# Patient Record
Sex: FEMALE | Race: WHITE | URBAN - NONMETROPOLITAN AREA
[De-identification: names, ages, dates, MRNs, and addresses within clinical notes are randomized per-mention and may not be internally consistent; named-entity substitution may affect disease eponyms.]

---

## 2022-03-01 ENCOUNTER — OFFICE VISIT (OUTPATIENT)
Dept: URBAN - NONMETROPOLITAN AREA CLINIC 4 | Facility: CLINIC | Age: 52
End: 2022-03-01

## 2022-05-02 ENCOUNTER — WEB ENCOUNTER (OUTPATIENT)
Dept: URBAN - METROPOLITAN AREA CLINIC 54 | Facility: CLINIC | Age: 52
End: 2022-05-02

## 2022-05-02 ENCOUNTER — OFFICE VISIT (OUTPATIENT)
Dept: URBAN - METROPOLITAN AREA CLINIC 54 | Facility: CLINIC | Age: 52
End: 2022-05-02
Payer: COMMERCIAL

## 2022-05-02 DIAGNOSIS — E13.9 DIABETES 1.5, MANAGED AS TYPE 2: ICD-10-CM

## 2022-05-02 DIAGNOSIS — K52.9 CHRONIC DIARRHEA: ICD-10-CM

## 2022-05-02 DIAGNOSIS — K58.9 IRRITABLE BOWEL SYNDROME: ICD-10-CM

## 2022-05-02 PROBLEM — 426875007: Status: ACTIVE | Noted: 2022-05-02

## 2022-05-02 PROCEDURE — 99214 OFFICE O/P EST MOD 30 MIN: CPT | Performed by: INTERNAL MEDICINE

## 2022-05-02 RX ORDER — CETIRIZINE HYDROCHLORIDE 10 MG/1
1 TABLET TABLET, FILM COATED ORAL ONCE A DAY
Refills: 0 | Status: ACTIVE | COMMUNITY
Start: 1900-01-01

## 2022-05-02 RX ORDER — METFORMIN ER 500 MG 500 MG/1
1 TABLET WITH EVENING MEAL TABLET ORAL ONCE A DAY
Qty: 30 | Status: ACTIVE | COMMUNITY
Start: 2022-05-02

## 2022-05-02 RX ORDER — ENALAPRIL MALEATE AND HYDROCHLOROTHIAZIDE 10; 25 MG/1; MG/1
1 TABLET TABLET ORAL ONCE A DAY
Refills: 0 | Status: ACTIVE | COMMUNITY
Start: 1900-01-01

## 2022-05-02 RX ORDER — ESOMEPRAZOLE MAGNESIUM 20 MG/1
1 CAPSULE CAPSULE, DELAYED RELEASE ORAL ONCE A DAY
Refills: 0 | Status: ACTIVE | COMMUNITY
Start: 1900-01-01

## 2022-05-02 NOTE — HPI-TODAY'S VISIT:
52 YO FEMALE WITH HX OF GERD OBESITY MIGRAINE AND HP POLYP IN 2019 PRESENTS FOR F/U VISIT.  Pt reports in Sept she started to have change in bowel habits. She had been on abx for infection of sinus. Developed C dif infection as a result.  Pt reports that now if she goes on an abx or steroid she has a problem.  Pt reports that her stools are explosive and urgency for bowel movements as well.  Pt reports that she has been taking bentyl off and on since that starts. Takes lomotil prn as well.  Pt reports that she had IBS in the past which was improved until the C dif infection.  Pt reports that she has been taking probiotics a few times per week.  Pt reports that she has had multiple round of abx for URI . No chronic cough. No tobacco.    Pt reports that she has been on metformin for the past 2 years as well.  Pt reports that she came off the med after losing 40# and watching diet. Went back on it in Dec. Off x 4-5 ms.  Now the consistency of the stools are intermittent.  +mucous +oil in stools

## 2022-05-10 LAB
CALPROTECTIN, FECAL: 30
PANCREATIC ELASTASE, FECAL: 467

## 2022-05-11 ENCOUNTER — LAB OUTSIDE AN ENCOUNTER (OUTPATIENT)
Dept: URBAN - METROPOLITAN AREA CLINIC 54 | Facility: CLINIC | Age: 52
End: 2022-05-11

## 2022-05-17 LAB — GASTROINTESTINAL PATHOGEN: (no result)

## 2022-07-18 ENCOUNTER — OFFICE VISIT (OUTPATIENT)
Dept: URBAN - METROPOLITAN AREA CLINIC 54 | Facility: CLINIC | Age: 52
End: 2022-07-18

## 2022-07-18 RX ORDER — ESOMEPRAZOLE MAGNESIUM 20 MG/1
1 CAPSULE CAPSULE, DELAYED RELEASE ORAL ONCE A DAY
Refills: 0 | Status: ACTIVE | COMMUNITY
Start: 1900-01-01

## 2022-07-18 RX ORDER — METFORMIN ER 500 MG 500 MG/1
1 TABLET WITH EVENING MEAL TABLET ORAL ONCE A DAY
Qty: 30 | Status: ACTIVE | COMMUNITY
Start: 2022-05-02

## 2022-07-18 RX ORDER — ENALAPRIL MALEATE AND HYDROCHLOROTHIAZIDE 10; 25 MG/1; MG/1
1 TABLET TABLET ORAL ONCE A DAY
Refills: 0 | Status: ACTIVE | COMMUNITY
Start: 1900-01-01

## 2022-07-18 RX ORDER — CETIRIZINE HYDROCHLORIDE 10 MG/1
1 TABLET TABLET, FILM COATED ORAL ONCE A DAY
Refills: 0 | Status: ACTIVE | COMMUNITY
Start: 1900-01-01

## 2023-02-14 ENCOUNTER — APPOINTMENT (RX ONLY)
Dept: URBAN - METROPOLITAN AREA OTHER 13 | Facility: OTHER | Age: 53
Setting detail: DERMATOLOGY
End: 2023-02-14

## 2023-02-14 DIAGNOSIS — L82.1 OTHER SEBORRHEIC KERATOSIS: ICD-10-CM

## 2023-02-14 DIAGNOSIS — Z71.89 OTHER SPECIFIED COUNSELING: ICD-10-CM

## 2023-02-14 DIAGNOSIS — B07.8 OTHER VIRAL WARTS: ICD-10-CM

## 2023-02-14 PROCEDURE — 17110 DESTRUCTION B9 LES UP TO 14: CPT

## 2023-02-14 PROCEDURE — 99202 OFFICE O/P NEW SF 15 MIN: CPT | Mod: 25

## 2023-02-14 PROCEDURE — ? COUNSELING

## 2023-02-14 PROCEDURE — ? LIQUID NITROGEN

## 2023-02-14 ASSESSMENT — LOCATION DETAILED DESCRIPTION DERM
LOCATION DETAILED: LEFT INFERIOR ANTERIOR NECK
LOCATION DETAILED: LEFT MEDIAL UPPER BACK
LOCATION DETAILED: RIGHT INFERIOR LATERAL NECK
LOCATION DETAILED: LEFT SUPERIOR OCCIPITAL SCALP
LOCATION DETAILED: RIGHT LATERAL EYEBROW
LOCATION DETAILED: RIGHT INFERIOR ANTERIOR NECK

## 2023-02-14 ASSESSMENT — LOCATION SIMPLE DESCRIPTION DERM
LOCATION SIMPLE: LEFT UPPER BACK
LOCATION SIMPLE: RIGHT ANTERIOR NECK
LOCATION SIMPLE: LEFT ANTERIOR NECK
LOCATION SIMPLE: LEFT OCCIPITAL SCALP
LOCATION SIMPLE: RIGHT EYEBROW

## 2023-02-14 ASSESSMENT — LOCATION ZONE DERM
LOCATION ZONE: NECK
LOCATION ZONE: SCALP
LOCATION ZONE: TRUNK
LOCATION ZONE: FACE

## 2023-02-14 NOTE — PROCEDURE: LIQUID NITROGEN
Show Topical Anesthesia Variable?: Yes
Consent: Advised patient of the risk of possible scars and pigmentation at LN2 site, patient states she understand the risk
Medical Necessity Information: It is in your best interest to select a reason for this procedure from the list below. All of these items fulfill various CMS LCD requirements except the new and changing color options.
Add 52 Modifier (Optional): no
Spray Paint Text: The liquid nitrogen was applied to the skin utilizing a spray paint frosting technique.
Detail Level: Detailed

## 2024-05-02 ENCOUNTER — DASHBOARD ENCOUNTERS (OUTPATIENT)
Age: 54
End: 2024-05-02

## 2024-05-02 ENCOUNTER — OFFICE VISIT (OUTPATIENT)
Dept: URBAN - NONMETROPOLITAN AREA CLINIC 4 | Facility: CLINIC | Age: 54
End: 2024-05-02
Payer: COMMERCIAL

## 2024-05-02 VITALS
HEIGHT: 63 IN | TEMPERATURE: 97.7 F | DIASTOLIC BLOOD PRESSURE: 78 MMHG | SYSTOLIC BLOOD PRESSURE: 118 MMHG | HEART RATE: 98 BPM | WEIGHT: 160.8 LBS | BODY MASS INDEX: 28.49 KG/M2

## 2024-05-02 DIAGNOSIS — R10.32 LLQ ABDOMINAL PAIN: ICD-10-CM

## 2024-05-02 DIAGNOSIS — K58.9 IRRITABLE BOWEL SYNDROME WITHOUT DIARRHEA: ICD-10-CM

## 2024-05-02 DIAGNOSIS — E13.9 DIABETES 1.5, MANAGED AS TYPE 2: ICD-10-CM

## 2024-05-02 PROCEDURE — 99214 OFFICE O/P EST MOD 30 MIN: CPT | Performed by: PHYSICIAN ASSISTANT

## 2024-05-02 RX ORDER — CETIRIZINE HYDROCHLORIDE 10 MG/1
1 TABLET TABLET, FILM COATED ORAL ONCE A DAY
Refills: 0 | Status: ACTIVE | COMMUNITY
Start: 1900-01-01

## 2024-05-02 RX ORDER — ONABOTULINUMTOXINA 200 [USP'U]/1
AS DIRECTED INJECTION, POWDER, LYOPHILIZED, FOR SOLUTION INTRADERMAL; INTRAMUSCULAR
Status: ACTIVE | COMMUNITY

## 2024-05-02 RX ORDER — ESOMEPRAZOLE MAGNESIUM 20 MG/1
1 CAPSULE CAPSULE, DELAYED RELEASE ORAL ONCE A DAY
Refills: 0 | Status: ACTIVE | COMMUNITY
Start: 1900-01-01

## 2024-05-02 RX ORDER — METFORMIN ER 500 MG 500 MG/1
1 TABLET WITH EVENING MEAL TABLET ORAL ONCE A DAY
Qty: 30 | Status: ON HOLD | COMMUNITY
Start: 2022-05-02

## 2024-05-02 RX ORDER — ENALAPRIL MALEATE AND HYDROCHLOROTHIAZIDE 10; 25 MG/1; MG/1
1 TABLET TABLET ORAL ONCE A DAY
Refills: 0 | Status: ACTIVE | COMMUNITY
Start: 1900-01-01

## 2024-05-02 RX ORDER — DOXYCYCLINE HYCLATE 100 MG/1
1 TABLET TABLET, COATED ORAL ONCE A DAY
Qty: 10 | OUTPATIENT
Start: 2024-05-02 | End: 2024-05-12

## 2024-05-02 RX ORDER — CLONAZEPAM 0.5 MG/1
1 TABLET TABLET ORAL TWICE DAILY
Status: ACTIVE | COMMUNITY
Start: 2024-04-30

## 2024-07-08 ENCOUNTER — OFFICE VISIT (OUTPATIENT)
Dept: URBAN - NONMETROPOLITAN AREA CLINIC 4 | Facility: CLINIC | Age: 54
End: 2024-07-08
Payer: COMMERCIAL

## 2024-07-08 VITALS
BODY MASS INDEX: 28.6 KG/M2 | SYSTOLIC BLOOD PRESSURE: 121 MMHG | HEART RATE: 103 BPM | DIASTOLIC BLOOD PRESSURE: 76 MMHG | HEIGHT: 63 IN | TEMPERATURE: 98.1 F | WEIGHT: 161.4 LBS

## 2024-07-08 DIAGNOSIS — K55.8 ANGIODYSPLASIA OF SMALL INTESTINE: ICD-10-CM

## 2024-07-08 DIAGNOSIS — R10.32 LLQ ABDOMINAL PAIN: ICD-10-CM

## 2024-07-08 DIAGNOSIS — E13.9 DIABETES 1.5, MANAGED AS TYPE 2: ICD-10-CM

## 2024-07-08 DIAGNOSIS — K58.8 OTHER IRRITABLE BOWEL SYNDROME: ICD-10-CM

## 2024-07-08 PROBLEM — 30588004: Status: ACTIVE | Noted: 2024-07-08

## 2024-07-08 PROCEDURE — 99214 OFFICE O/P EST MOD 30 MIN: CPT | Performed by: PHYSICIAN ASSISTANT

## 2024-07-08 RX ORDER — ONABOTULINUMTOXINA 200 [USP'U]/1
AS DIRECTED INJECTION, POWDER, LYOPHILIZED, FOR SOLUTION INTRADERMAL; INTRAMUSCULAR
Status: ACTIVE | COMMUNITY

## 2024-07-08 RX ORDER — SODIUM PICOSULFATE, MAGNESIUM OXIDE, AND ANHYDROUS CITRIC ACID 12; 3.5; 1 G/175ML; G/175ML; MG/175ML
175 ML THE FIRST DOSE THE EVENING BEFORE AND SECOND DOSE THE MORNING OF COLONOSCOPY LIQUID ORAL
Qty: 1 UNSPECIFIED | Refills: 0 | OUTPATIENT
Start: 2024-07-08 | End: 2024-07-09

## 2024-07-08 RX ORDER — ENALAPRIL MALEATE AND HYDROCHLOROTHIAZIDE 10; 25 MG/1; MG/1
1 TABLET TABLET ORAL ONCE A DAY
Refills: 0 | Status: ACTIVE | COMMUNITY
Start: 1900-01-01

## 2024-07-08 RX ORDER — METFORMIN ER 500 MG 500 MG/1
1 TABLET WITH EVENING MEAL TABLET ORAL ONCE A DAY
Qty: 30 | Status: DISCONTINUED | COMMUNITY
Start: 2022-05-02

## 2024-07-08 RX ORDER — CETIRIZINE HYDROCHLORIDE 10 MG/1
1 TABLET TABLET, FILM COATED ORAL ONCE A DAY
Refills: 0 | Status: ACTIVE | COMMUNITY
Start: 1900-01-01

## 2024-07-08 RX ORDER — ESOMEPRAZOLE MAGNESIUM 20 MG/1
1 CAPSULE CAPSULE, DELAYED RELEASE ORAL ONCE A DAY
Refills: 0 | Status: ACTIVE | COMMUNITY
Start: 1900-01-01

## 2024-07-08 RX ORDER — CLONAZEPAM 0.5 MG/1
1 TABLET TABLET ORAL TWICE DAILY
Status: DISCONTINUED | COMMUNITY
Start: 2024-04-30

## 2024-07-08 NOTE — HPI-TODAY'S VISIT:
52 YO FEMALE WITH HX OF GERD OBESITY MIGRAINE AND HP POLYP IN 2019 PRESENTS FOR F/U VISIT.  Pt reports in Sept she started to have change in bowel habits. She had been on abx for infection of sinus. Developed C dif infection as a result.  Pt reports that now if she goes on an abx or steroid she has a problem.  Pt reports that her stools are explosive and urgency for bowel movements as well.  Pt reports that she has been taking bentyl off and on since that starts. Takes lomotil prn as well.  Pt reports that she had IBS in the past which was improved until the C dif infection.  Pt reports that she has been taking probiotics a few times per week.  Pt reports that she has had multiple round of abx for URI . No chronic cough. No tobacco.    Pt reports that she has been on metformin for the past 2 years as well.  Pt reports that she came off the med after losing 40# and watching diet. Went back on it in Dec. Off x 4-5 ms.  Now the consistency of the stools are intermittent.  +mucous +oil in stools  5.2.24 Here for follow up after she had n/v which severe and eventually lead to near syncope.  She states this happened after visiting a ffiends family who was ill in the hospital  After the near syncope, she had BRBPR with tissue like appearance to the stools.  Her last cscope was in 2018, normal and repeat due in 2029  7.8.24 here today for repeat episode as what happened in April 2024. abd pain, rectal beeding.  I advised her to go to the ER, noted to have left sided colitis  started on ABX, HD stable discharged today from [facility]  I asked patient to come see me today  labs reviewed  non smoker, non drinker  is taking 0.25 GLP-1 Ozempic for DM II- which was started in November  CT abd pel : \*Unknown; IMPRESSION: 1.  Mild diffuse fatty infiltration of the liver. 2.  Status post cholecystectomy and hysterectomy. 3.  Status post appendectomy. 4.  Mild thickening of the walls of the descending colon suspicious for an infectious or inflammatory colitis.

## 2024-07-16 ENCOUNTER — OFFICE VISIT (OUTPATIENT)
Dept: URBAN - NONMETROPOLITAN AREA CLINIC 4 | Facility: CLINIC | Age: 54
End: 2024-07-16

## 2024-08-12 ENCOUNTER — OFFICE VISIT (OUTPATIENT)
Dept: URBAN - METROPOLITAN AREA SURGERY CENTER 14 | Facility: SURGERY CENTER | Age: 54
End: 2024-08-12
Payer: COMMERCIAL

## 2024-08-12 ENCOUNTER — CLAIMS CREATED FROM THE CLAIM WINDOW (OUTPATIENT)
Dept: URBAN - METROPOLITAN AREA CLINIC 4 | Facility: CLINIC | Age: 54
End: 2024-08-12
Payer: COMMERCIAL

## 2024-08-12 DIAGNOSIS — K64.8 EXTERNAL HEMORRHOIDS: ICD-10-CM

## 2024-08-12 DIAGNOSIS — K64.4 PERIANAL SKIN TAGS: ICD-10-CM

## 2024-08-12 DIAGNOSIS — Z87.19 HISTORY OF COLITIS: ICD-10-CM

## 2024-08-12 DIAGNOSIS — K57.30 DIVERTICULA OF COLON: ICD-10-CM

## 2024-08-12 DIAGNOSIS — K63.89 OTHER SPECIFIED DISEASES OF INTESTINE: ICD-10-CM

## 2024-08-12 DIAGNOSIS — K64.8 OTHER HEMORRHOIDS: ICD-10-CM

## 2024-08-12 DIAGNOSIS — K57.30 DIVERTCULOSIS OF LG INT W/O PERFORATION OR ABSCESS W/O BLEEDING: ICD-10-CM

## 2024-08-12 DIAGNOSIS — R10.32 ABDOMINAL PAIN, LEFT LOWER QUADRANT: ICD-10-CM

## 2024-08-12 PROCEDURE — 45380 COLONOSCOPY AND BIOPSY: CPT | Performed by: INTERNAL MEDICINE

## 2024-08-12 PROCEDURE — 88342 IMHCHEM/IMCYTCHM 1ST ANTB: CPT | Performed by: PATHOLOGY

## 2024-08-12 PROCEDURE — 88313 SPECIAL STAINS GROUP 2: CPT | Performed by: PATHOLOGY

## 2024-08-12 PROCEDURE — 00811 ANES LWR INTST NDSC NOS: CPT | Performed by: NURSE ANESTHETIST, CERTIFIED REGISTERED

## 2024-08-12 PROCEDURE — 88305 TISSUE EXAM BY PATHOLOGIST: CPT | Performed by: PATHOLOGY

## 2024-08-12 RX ORDER — ONABOTULINUMTOXINA 200 [USP'U]/1
AS DIRECTED INJECTION, POWDER, LYOPHILIZED, FOR SOLUTION INTRADERMAL; INTRAMUSCULAR
Status: ACTIVE | COMMUNITY

## 2024-08-12 RX ORDER — CETIRIZINE HYDROCHLORIDE 10 MG/1
1 TABLET TABLET, FILM COATED ORAL ONCE A DAY
Refills: 0 | Status: ACTIVE | COMMUNITY
Start: 1900-01-01

## 2024-08-12 RX ORDER — ENALAPRIL MALEATE AND HYDROCHLOROTHIAZIDE 10; 25 MG/1; MG/1
1 TABLET TABLET ORAL ONCE A DAY
Refills: 0 | Status: ACTIVE | COMMUNITY
Start: 1900-01-01

## 2024-08-12 RX ORDER — ESOMEPRAZOLE MAGNESIUM 20 MG/1
1 CAPSULE CAPSULE, DELAYED RELEASE ORAL ONCE A DAY
Refills: 0 | Status: ACTIVE | COMMUNITY
Start: 1900-01-01

## 2024-08-23 ENCOUNTER — TELEPHONE ENCOUNTER (OUTPATIENT)
Dept: URBAN - NONMETROPOLITAN AREA CLINIC 4 | Facility: CLINIC | Age: 54
End: 2024-08-23

## 2024-08-23 ENCOUNTER — OFFICE VISIT (OUTPATIENT)
Dept: URBAN - METROPOLITAN AREA SURGERY CENTER 14 | Facility: SURGERY CENTER | Age: 54
End: 2024-08-23

## 2024-08-23 RX ORDER — DICYCLOMINE HYDROCHLORIDE 20 MG/1
1 TABLET TABLET ORAL THREE TIMES A DAY
Qty: 90 | Refills: 3 | OUTPATIENT
Start: 2024-08-27 | End: 2024-12-24

## 2024-08-30 ENCOUNTER — OFFICE VISIT (OUTPATIENT)
Dept: URBAN - NONMETROPOLITAN AREA CLINIC 4 | Facility: CLINIC | Age: 54
End: 2024-08-30
Payer: COMMERCIAL

## 2024-08-30 VITALS
DIASTOLIC BLOOD PRESSURE: 84 MMHG | WEIGHT: 164.2 LBS | HEIGHT: 63 IN | SYSTOLIC BLOOD PRESSURE: 131 MMHG | TEMPERATURE: 98.3 F | HEART RATE: 106 BPM | BODY MASS INDEX: 29.09 KG/M2

## 2024-08-30 DIAGNOSIS — K58.9 IRRITABLE BOWEL SYNDROME: ICD-10-CM

## 2024-08-30 DIAGNOSIS — E13.9 DIABETES 1.5, MANAGED AS TYPE 2: ICD-10-CM

## 2024-08-30 DIAGNOSIS — K52.9 COLITIS: ICD-10-CM

## 2024-08-30 DIAGNOSIS — K55.9 ISCHEMIC COLITIS: ICD-10-CM

## 2024-08-30 DIAGNOSIS — R10.32 LLQ ABDOMINAL PAIN: ICD-10-CM

## 2024-08-30 PROCEDURE — 99214 OFFICE O/P EST MOD 30 MIN: CPT | Performed by: PHYSICIAN ASSISTANT

## 2024-08-30 RX ORDER — ENALAPRIL MALEATE AND HYDROCHLOROTHIAZIDE 10; 25 MG/1; MG/1
1 TABLET TABLET ORAL ONCE A DAY
Refills: 0 | Status: ACTIVE | COMMUNITY
Start: 1900-01-01

## 2024-08-30 RX ORDER — ONABOTULINUMTOXINA 200 [USP'U]/1
AS DIRECTED INJECTION, POWDER, LYOPHILIZED, FOR SOLUTION INTRADERMAL; INTRAMUSCULAR
Status: ACTIVE | COMMUNITY

## 2024-08-30 RX ORDER — ESOMEPRAZOLE MAGNESIUM 20 MG/1
1 CAPSULE CAPSULE, DELAYED RELEASE ORAL ONCE A DAY
Refills: 0 | Status: ACTIVE | COMMUNITY
Start: 1900-01-01

## 2024-08-30 RX ORDER — DICYCLOMINE HYDROCHLORIDE 20 MG/1
1 TABLET TABLET ORAL THREE TIMES A DAY
Qty: 90 | Refills: 3 | Status: ACTIVE | COMMUNITY
Start: 2024-08-27 | End: 2024-12-24

## 2024-08-30 RX ORDER — BUDESONIDE 3 MG/1
1 CAPSULE CAPSULE ORAL ONCE A DAY
Qty: 30 CAPSULE | Refills: 1 | OUTPATIENT
Start: 2024-08-30

## 2024-08-30 RX ORDER — CETIRIZINE HYDROCHLORIDE 10 MG/1
1 TABLET TABLET, FILM COATED ORAL ONCE A DAY
Refills: 0 | Status: ACTIVE | COMMUNITY
Start: 1900-01-01

## 2024-08-30 NOTE — PHYSICAL EXAM CONSTITUTIONAL:
well developed, well nourished , in no acute distress , ambulating without difficulty , normal communication ability slurred speech

## 2024-08-30 NOTE — HPI-TODAY'S VISIT:
50 YO FEMALE WITH HX OF GERD OBESITY MIGRAINE AND HP POLYP IN 2019 PRESENTS FOR F/U VISIT.  Pt reports in Sept she started to have change in bowel habits. She had been on abx for infection of sinus. Developed C dif infection as a result.  Pt reports that now if she goes on an abx or steroid she has a problem.  Pt reports that her stools are explosive and urgency for bowel movements as well.  Pt reports that she has been taking bentyl off and on since that starts. Takes lomotil prn as well.  Pt reports that she had IBS in the past which was improved until the C dif infection.  Pt reports that she has been taking probiotics a few times per week.  Pt reports that she has had multiple round of abx for URI . No chronic cough. No tobacco.    Pt reports that she has been on metformin for the past 2 years as well.  Pt reports that she came off the med after losing 40# and watching diet. Went back on it in Dec. Off x 4-5 ms.  Now the consistency of the stools are intermittent.  +mucous +oil in stools  5.2.24 Here for follow up after she had n/v which severe and eventually lead to near syncope.  She states this happened after visiting a ffiends family who was ill in the hospital  After the near syncope, she had BRBPR with tissue like appearance to the stools.  Her last cscope was in 2018, normal and repeat due in 2029  7.8.24 here today for repeat episode as what happened in April 2024. abd pain, rectal beeding.  I advised her to go to the ER, noted to have left sided colitis  started on ABX, HD stable discharged today from [facility]  I asked patient to come see me today  labs reviewed  non smoker, non drinker  is taking 0.25 GLP-1 Ozempic for DM II- which was started in November  CT abd pel : \*Unknown;  IMPRESSION:  1.  Mild diffuse fatty infiltration of the liver.  2.  Status post cholecystectomy and hysterectomy.  3.  Status post appendectomy.  4.  Mild thickening of the walls of the descending colon suspicious for an infectious or inflammatory colitis.  8.30.24 cscope with mild diffuse garnularity of the entire colon, with bx with no abnormality off Ozempic, but with no further bleeding but persistent intermittent abd pain.

## 2024-09-16 ENCOUNTER — P2P PATIENT RECORD (OUTPATIENT)
Age: 54
End: 2024-09-16

## 2024-09-24 ENCOUNTER — WEB ENCOUNTER (OUTPATIENT)
Dept: URBAN - NONMETROPOLITAN AREA CLINIC 4 | Facility: CLINIC | Age: 54
End: 2024-09-24

## 2024-09-27 ENCOUNTER — OFFICE VISIT (OUTPATIENT)
Dept: URBAN - NONMETROPOLITAN AREA CLINIC 4 | Facility: CLINIC | Age: 54
End: 2024-09-27

## 2024-10-02 ENCOUNTER — OFFICE VISIT (OUTPATIENT)
Dept: URBAN - NONMETROPOLITAN AREA CLINIC 4 | Facility: CLINIC | Age: 54
End: 2024-10-02
Payer: COMMERCIAL

## 2024-10-02 VITALS
TEMPERATURE: 98 F | DIASTOLIC BLOOD PRESSURE: 100 MMHG | SYSTOLIC BLOOD PRESSURE: 141 MMHG | WEIGHT: 165 LBS | BODY MASS INDEX: 29.23 KG/M2 | HEIGHT: 63 IN | HEART RATE: 104 BPM

## 2024-10-02 DIAGNOSIS — K52.89 (LYMPHOCYTIC) MICROSCOPIC COLITIS: ICD-10-CM

## 2024-10-02 DIAGNOSIS — K58.9 IRRITABLE BOWEL SYNDROME: ICD-10-CM

## 2024-10-02 DIAGNOSIS — E13.9 DIABETES 1.5, MANAGED AS TYPE 2: ICD-10-CM

## 2024-10-02 DIAGNOSIS — R10.32 LLQ ABDOMINAL PAIN: ICD-10-CM

## 2024-10-02 PROCEDURE — 99214 OFFICE O/P EST MOD 30 MIN: CPT | Performed by: PHYSICIAN ASSISTANT

## 2024-10-02 RX ORDER — ONABOTULINUMTOXINA 200 [USP'U]/1
AS DIRECTED INJECTION, POWDER, LYOPHILIZED, FOR SOLUTION INTRADERMAL; INTRAMUSCULAR
COMMUNITY

## 2024-10-02 RX ORDER — CETIRIZINE HYDROCHLORIDE 10 MG/1
1 TABLET TABLET, FILM COATED ORAL ONCE A DAY
Refills: 0 | COMMUNITY
Start: 1900-01-01

## 2024-10-02 RX ORDER — DICYCLOMINE HYDROCHLORIDE 20 MG/1
1 TABLET TABLET ORAL THREE TIMES A DAY
Qty: 90 | Refills: 3 | COMMUNITY
Start: 2024-08-27 | End: 2024-12-24

## 2024-10-02 RX ORDER — BUDESONIDE 3 MG/1
1 CAPSULE CAPSULE ORAL ONCE A DAY
Qty: 30 CAPSULE | Refills: 1 | COMMUNITY
Start: 2024-08-30

## 2024-10-02 RX ORDER — ESOMEPRAZOLE MAGNESIUM 20 MG/1
1 CAPSULE CAPSULE, DELAYED RELEASE ORAL ONCE A DAY
Refills: 0 | COMMUNITY
Start: 1900-01-01

## 2024-10-02 RX ORDER — ENALAPRIL MALEATE AND HYDROCHLOROTHIAZIDE 10; 25 MG/1; MG/1
1 TABLET TABLET ORAL ONCE A DAY
Refills: 0 | COMMUNITY
Start: 1900-01-01

## 2024-10-02 RX ORDER — MESALAMINE 1.2 G/1
2 TABLETS WITH A MEAL TABLET, DELAYED RELEASE ORAL ONCE A DAY
Qty: 120 TABLET | Refills: 1 | OUTPATIENT
Start: 2024-10-02 | End: 2025-01-29

## 2024-10-02 NOTE — HPI-TODAY'S VISIT:
50 YO FEMALE WITH HX OF GERD OBESITY MIGRAINE AND HP POLYP IN 2019 PRESENTS FOR F/U VISIT.  Pt reports in Sept she started to have change in bowel habits. She had been on abx for infection of sinus. Developed C dif infection as a result.  Pt reports that now if she goes on an abx or steroid she has a problem.  Pt reports that her stools are explosive and urgency for bowel movements as well.  Pt reports that she has been taking bentyl off and on since that starts. Takes lomotil prn as well.  Pt reports that she had IBS in the past which was improved until the C dif infection.  Pt reports that she has been taking probiotics a few times per week.  Pt reports that she has had multiple round of abx for URI . No chronic cough. No tobacco.    Pt reports that she has been on metformin for the past 2 years as well.  Pt reports that she came off the med after losing 40# and watching diet. Went back on it in Dec. Off x 4-5 ms.  Now the consistency of the stools are intermittent.  +mucous +oil in stools  5.2.24 Here for follow up after she had n/v which severe and eventually lead to near syncope.  She states this happened after visiting a ffiends family who was ill in the hospital  After the near syncope, she had BRBPR with tissue like appearance to the stools.  Her last cscope was in 2018, normal and repeat due in 2029  7.8.24 here today for repeat episode as what happened in April 2024. abd pain, rectal beeding.  I advised her to go to the ER, noted to have left sided colitis  started on ABX, HD stable discharged today from [facility]  I asked patient to come see me today  labs reviewed  non smoker, non drinker  is taking 0.25 GLP-1 Ozempic for DM II- which was started in November  CT abd pel : \*Unknown;  IMPRESSION:  1.  Mild diffuse fatty infiltration of the liver.  2.  Status post cholecystectomy and hysterectomy.  3.  Status post appendectomy.  4.  Mild thickening of the walls of the descending colon suspicious for an infectious or inflammatory colitis.  8.30.24 cscope with mild diffuse garnularity of the entire colon, with bx with no abnormality off Ozempic, but with no further bleeding but persistent intermittent abd pain.  10.2.24 doing well, although with tapering of budesonide, did have some reoccurence of cramping no fevers, no chills.  afebrile

## 2024-10-05 ENCOUNTER — P2P PATIENT RECORD (OUTPATIENT)
Age: 54
End: 2024-10-05

## 2024-10-10 LAB
ANA SCREEN, IFA: NEGATIVE
ANCA SCREEN: NEGATIVE
C-REACTIVE PROTEIN, QUANT: 7.5
CYCLIC CITRULLINATED PEPTIDE (CCP) AB (IGG): <16
GASCA: 13.8
INR: 1
INTERPRETATION: (no result)
MYELOPEROXIDASE ANTIBODY: <1
PROTEINASE-3 ANTIBODY: <1
PT: 10.5
RHEUMATOID FACTOR: <10
SACCHAROMYCES CEREVISIAE AB (ASCA) (IGA): 5.3

## 2024-12-03 ENCOUNTER — OFFICE VISIT (OUTPATIENT)
Dept: URBAN - NONMETROPOLITAN AREA CLINIC 4 | Facility: CLINIC | Age: 54
End: 2024-12-03
Payer: COMMERCIAL

## 2024-12-03 VITALS
HEIGHT: 63 IN | DIASTOLIC BLOOD PRESSURE: 77 MMHG | HEART RATE: 113 BPM | WEIGHT: 176 LBS | TEMPERATURE: 98 F | SYSTOLIC BLOOD PRESSURE: 120 MMHG | BODY MASS INDEX: 31.18 KG/M2

## 2024-12-03 DIAGNOSIS — R10.32 LLQ ABDOMINAL PAIN: ICD-10-CM

## 2024-12-03 DIAGNOSIS — E13.9 DIABETES 1.5, MANAGED AS TYPE 2: ICD-10-CM

## 2024-12-03 DIAGNOSIS — K58.9 IRRITABLE BOWEL SYNDROME: ICD-10-CM

## 2024-12-03 DIAGNOSIS — K52.9 COLITIS: ICD-10-CM

## 2024-12-03 PROCEDURE — 99214 OFFICE O/P EST MOD 30 MIN: CPT | Performed by: PHYSICIAN ASSISTANT

## 2024-12-03 RX ORDER — DICYCLOMINE HYDROCHLORIDE 20 MG/1
1 TABLET TABLET ORAL THREE TIMES A DAY
Qty: 90 | Refills: 3 | Status: ACTIVE | COMMUNITY
Start: 2024-08-27 | End: 2024-12-24

## 2024-12-03 RX ORDER — ONABOTULINUMTOXINA 200 [USP'U]/1
AS DIRECTED INJECTION, POWDER, LYOPHILIZED, FOR SOLUTION INTRADERMAL; INTRAMUSCULAR
Status: ACTIVE | COMMUNITY

## 2024-12-03 RX ORDER — CETIRIZINE HYDROCHLORIDE 10 MG/1
1 TABLET TABLET, FILM COATED ORAL ONCE A DAY
Refills: 0 | Status: ACTIVE | COMMUNITY
Start: 1900-01-01

## 2024-12-03 RX ORDER — ESOMEPRAZOLE MAGNESIUM 20 MG/1
1 CAPSULE CAPSULE, DELAYED RELEASE ORAL ONCE A DAY
Refills: 0 | Status: ACTIVE | COMMUNITY
Start: 1900-01-01

## 2024-12-03 RX ORDER — MESALAMINE 1.2 G/1
2 TABLETS WITH A MEAL TABLET, DELAYED RELEASE ORAL ONCE A DAY
Qty: 120 TABLET | Refills: 1 | Status: ACTIVE | COMMUNITY
Start: 2024-10-02 | End: 2025-01-29

## 2024-12-03 RX ORDER — MESALAMINE 1.2 G/1
2 TABLETS WITH A MEAL TABLET, DELAYED RELEASE ORAL ONCE A DAY
Qty: 120 TABLET | Refills: 1 | OUTPATIENT

## 2024-12-03 RX ORDER — ENALAPRIL MALEATE AND HYDROCHLOROTHIAZIDE 10; 25 MG/1; MG/1
1 TABLET TABLET ORAL ONCE A DAY
Refills: 0 | Status: ACTIVE | COMMUNITY
Start: 1900-01-01

## 2024-12-03 RX ORDER — BUDESONIDE 3 MG/1
1 CAPSULE CAPSULE ORAL ONCE A DAY
Qty: 30 CAPSULE | Refills: 1 | Status: ACTIVE | COMMUNITY
Start: 2024-08-30

## 2024-12-03 NOTE — HPI-TODAY'S VISIT:
52 YO FEMALE WITH HX OF GERD OBESITY MIGRAINE AND HP POLYP IN 2019 PRESENTS FOR F/U VISIT.  Pt reports in Sept she started to have change in bowel habits. She had been on abx for infection of sinus. Developed C dif infection as a result.  Pt reports that now if she goes on an abx or steroid she has a problem.  Pt reports that her stools are explosive and urgency for bowel movements as well.  Pt reports that she has been taking bentyl off and on since that starts. Takes lomotil prn as well.  Pt reports that she had IBS in the past which was improved until the C dif infection.  Pt reports that she has been taking probiotics a few times per week.  Pt reports that she has had multiple round of abx for URI . No chronic cough. No tobacco.    Pt reports that she has been on metformin for the past 2 years as well.  Pt reports that she came off the med after losing 40# and watching diet. Went back on it in Dec. Off x 4-5 ms.  Now the consistency of the stools are intermittent.  +mucous +oil in stools  5.2.24 Here for follow up after she had n/v which severe and eventually lead to near syncope.  She states this happened after visiting a ffiends family who was ill in the hospital  After the near syncope, she had BRBPR with tissue like appearance to the stools.  Her last cscope was in 2018, normal and repeat due in 2029  7.8.24 here today for repeat episode as what happened in April 2024. abd pain, rectal beeding.  I advised her to go to the ER, noted to have left sided colitis  started on ABX, HD stable discharged today from [facility]  I asked patient to come see me today  labs reviewed  non smoker, non drinker  is taking 0.25 GLP-1 Ozempic for DM II- which was started in November  CT abd pel : \*Unknown;  IMPRESSION:  1.  Mild diffuse fatty infiltration of the liver.  2.  Status post cholecystectomy and hysterectomy.  3.  Status post appendectomy.  4.  Mild thickening of the walls of the descending colon suspicious for an infectious or inflammatory colitis.  8.30.24 cscope with mild diffuse garnularity of the entire colon, with bx with no abnormality off Ozempic, but with no further bleeding but persistent intermittent abd pain.  10.2.24 doing well, although with tapering of budesonide, did have some reoccurence of cramping no fevers, no chills.  afebrile  12.3.24 no abd pain, no diarrhea taking (2) 1.2 g mesalamine tablets daily, with essentially resolution of symptoms.

## 2025-01-25 ENCOUNTER — WEB ENCOUNTER (OUTPATIENT)
Age: 55
End: 2025-01-25

## 2025-01-25 RX ORDER — MESALAMINE 1.2 G/1
2 TABLETS WITH A MEAL TABLET, DELAYED RELEASE ORAL ONCE A DAY
Qty: 120 TABLET | Refills: 1

## 2025-01-25 RX ORDER — BUDESONIDE 3 MG/1
2 CAPSULES CAPSULE ORAL ONCE A DAY
Qty: 60 CAPSULE | Refills: 0 | OUTPATIENT
Start: 2025-01-25

## 2025-03-03 ENCOUNTER — WEB ENCOUNTER (OUTPATIENT)
Dept: URBAN - NONMETROPOLITAN AREA CLINIC 4 | Facility: CLINIC | Age: 55
End: 2025-03-03

## 2025-03-03 RX ORDER — MESALAMINE 1.2 G/1
2 TABLETS WITH A MEAL TABLET, DELAYED RELEASE ORAL ONCE A DAY
Qty: 120 TABLET | Refills: 1
End: 2025-07-02

## 2025-03-04 ENCOUNTER — TELEPHONE ENCOUNTER (OUTPATIENT)
Dept: URBAN - NONMETROPOLITAN AREA CLINIC 4 | Facility: CLINIC | Age: 55
End: 2025-03-04

## 2025-03-05 ENCOUNTER — OFFICE VISIT (OUTPATIENT)
Dept: URBAN - NONMETROPOLITAN AREA CLINIC 4 | Facility: CLINIC | Age: 55
End: 2025-03-05
Payer: COMMERCIAL

## 2025-03-05 VITALS
TEMPERATURE: 98.2 F | HEART RATE: 106 BPM | DIASTOLIC BLOOD PRESSURE: 84 MMHG | WEIGHT: 183 LBS | BODY MASS INDEX: 32.43 KG/M2 | SYSTOLIC BLOOD PRESSURE: 123 MMHG | HEIGHT: 63 IN

## 2025-03-05 DIAGNOSIS — K58.0 IBS-D: ICD-10-CM

## 2025-03-05 DIAGNOSIS — R10.32 LLQ ABDOMINAL PAIN: ICD-10-CM

## 2025-03-05 DIAGNOSIS — E13.9 DIABETES 1.5, MANAGED AS TYPE 2: ICD-10-CM

## 2025-03-05 PROCEDURE — 99214 OFFICE O/P EST MOD 30 MIN: CPT | Performed by: PHYSICIAN ASSISTANT

## 2025-03-05 RX ORDER — BUDESONIDE 3 MG/1
1 CAPSULE CAPSULE ORAL ONCE A DAY
Qty: 30 CAPSULE | Refills: 1 | Status: ACTIVE | COMMUNITY
Start: 2024-08-30

## 2025-03-05 RX ORDER — ESOMEPRAZOLE MAGNESIUM 20 MG/1
1 CAPSULE CAPSULE, DELAYED RELEASE ORAL ONCE A DAY
Refills: 0 | Status: ACTIVE | COMMUNITY
Start: 1900-01-01

## 2025-03-05 RX ORDER — ONABOTULINUMTOXINA 200 [USP'U]/1
AS DIRECTED INJECTION, POWDER, LYOPHILIZED, FOR SOLUTION INTRADERMAL; INTRAMUSCULAR
Status: ACTIVE | COMMUNITY

## 2025-03-05 RX ORDER — CETIRIZINE HYDROCHLORIDE 10 MG/1
1 TABLET TABLET, FILM COATED ORAL ONCE A DAY
Refills: 0 | Status: ACTIVE | COMMUNITY
Start: 1900-01-01

## 2025-03-05 RX ORDER — BUDESONIDE 3 MG/1
2 CAPSULES CAPSULE ORAL ONCE A DAY
Qty: 60 CAPSULE | Refills: 0 | Status: ACTIVE | COMMUNITY
Start: 2025-01-25

## 2025-03-05 RX ORDER — ENALAPRIL MALEATE AND HYDROCHLOROTHIAZIDE 10; 25 MG/1; MG/1
1 TABLET TABLET ORAL ONCE A DAY
Refills: 0 | Status: ACTIVE | COMMUNITY
Start: 1900-01-01

## 2025-03-05 RX ORDER — MESALAMINE 1.2 G/1
2 TABLETS WITH A MEAL TABLET, DELAYED RELEASE ORAL ONCE A DAY
Qty: 120 TABLET | Refills: 1 | Status: ACTIVE | COMMUNITY
End: 2025-07-02

## 2025-03-05 NOTE — HPI-TODAY'S VISIT:
52 YO FEMALE WITH HX OF GERD OBESITY MIGRAINE AND HP POLYP IN 2019 PRESENTS FOR F/U VISIT.  Pt reports in Sept she started to have change in bowel habits. She had been on abx for infection of sinus. Developed C dif infection as a result.  Pt reports that now if she goes on an abx or steroid she has a problem.  Pt reports that her stools are explosive and urgency for bowel movements as well.  Pt reports that she has been taking bentyl off and on since that starts. Takes lomotil prn as well.  Pt reports that she had IBS in the past which was improved until the C dif infection.  Pt reports that she has been taking probiotics a few times per week.  Pt reports that she has had multiple round of abx for URI . No chronic cough. No tobacco.    Pt reports that she has been on metformin for the past 2 years as well.  Pt reports that she came off the med after losing 40# and watching diet. Went back on it in Dec. Off x 4-5 ms.  Now the consistency of the stools are intermittent.  +mucous +oil in stools  5.2.24 Here for follow up after she had n/v which severe and eventually lead to near syncope.  She states this happened after visiting a ffiends family who was ill in the hospital  After the near syncope, she had BRBPR with tissue like appearance to the stools.  Her last cscope was in 2018, normal and repeat due in 2029  7.8.24 here today for repeat episode as what happened in April 2024. abd pain, rectal beeding.  I advised her to go to the ER, noted to have left sided colitis  started on ABX, HD stable discharged today from [facility]  I asked patient to come see me today  labs reviewed  non smoker, non drinker  is taking 0.25 GLP-1 Ozempic for DM II- which was started in November  CT abd pel : \*Unknown;  IMPRESSION:  1.  Mild diffuse fatty infiltration of the liver.  2.  Status post cholecystectomy and hysterectomy.  3.  Status post appendectomy.  4.  Mild thickening of the walls of the descending colon suspicious for an infectious or inflammatory colitis.  8.30.24 cscope with mild diffuse garnularity of the entire colon, with bx with no abnormality off Ozempic, but with no further bleeding but persistent intermittent abd pain.  10.2.24 doing well, although with tapering of budesonide, did have some reoccurence of cramping no fevers, no chills.  afebrile  12.3.24 no abd pain, no diarrhea taking (2) 1.2 g mesalamine tablets daily, with essentially resolution of symptoms.  3.5.25 Tracee is continuing to have abdominal pain, she denies any blood in the stool. Because of her abdominal pain I went ahead in order of repeat of the CT of the abdomen pelvis which again shows this persistent left sided colitis.  She is already taking Lialda 2 tablets daily with  no improvement in symtpoms.  Previously responded to prednisone, but no improvement with Budesonide.

## 2025-03-06 ENCOUNTER — TELEPHONE ENCOUNTER (OUTPATIENT)
Dept: URBAN - NONMETROPOLITAN AREA CLINIC 4 | Facility: CLINIC | Age: 55
End: 2025-03-06

## 2025-03-08 LAB
VARICELLA ZOSTER VIRUS ANTIBODY (IGG): 41.8
VARICELLA ZOSTER VIRUS ANTIBODY (IGM): <=0.9

## 2025-03-14 ENCOUNTER — TELEPHONE ENCOUNTER (OUTPATIENT)
Dept: URBAN - NONMETROPOLITAN AREA CLINIC 4 | Facility: CLINIC | Age: 55
End: 2025-03-14

## 2025-03-14 RX ORDER — UPADACITINIB 30 MG/1
1 TABLET TABLET, EXTENDED RELEASE ORAL ONCE A DAY
Qty: 30 | Refills: 1 | OUTPATIENT
Start: 2025-03-14 | End: 2025-05-13

## 2025-03-14 RX ORDER — UPADACITINIB 45 MG/1
AS DIRECTED TABLET, EXTENDED RELEASE ORAL DAILY
Qty: 60 | Refills: 0 | OUTPATIENT
Start: 2025-03-14 | End: 2025-05-13

## 2025-03-17 ENCOUNTER — TELEPHONE ENCOUNTER (OUTPATIENT)
Dept: URBAN - NONMETROPOLITAN AREA CLINIC 4 | Facility: CLINIC | Age: 55
End: 2025-03-17

## 2025-03-18 ENCOUNTER — TELEPHONE ENCOUNTER (OUTPATIENT)
Dept: URBAN - NONMETROPOLITAN AREA CLINIC 4 | Facility: CLINIC | Age: 55
End: 2025-03-18

## 2025-03-18 RX ORDER — UPADACITINIB 30 MG/1
1 TABLET TABLET, EXTENDED RELEASE ORAL ONCE A DAY
Qty: 30 | Refills: 1
Start: 2025-03-14 | End: 2025-05-17

## 2025-03-18 RX ORDER — UPADACITINIB 45 MG/1
AS DIRECTED TABLET, EXTENDED RELEASE ORAL DAILY
Qty: 60 | Refills: 0
Start: 2025-03-14 | End: 2025-05-17

## 2025-03-21 ENCOUNTER — TELEPHONE ENCOUNTER (OUTPATIENT)
Dept: URBAN - NONMETROPOLITAN AREA CLINIC 4 | Facility: CLINIC | Age: 55
End: 2025-03-21

## 2025-03-27 ENCOUNTER — TELEPHONE ENCOUNTER (OUTPATIENT)
Dept: URBAN - NONMETROPOLITAN AREA CLINIC 4 | Facility: CLINIC | Age: 55
End: 2025-03-27

## 2025-03-28 ENCOUNTER — TELEPHONE ENCOUNTER (OUTPATIENT)
Dept: URBAN - NONMETROPOLITAN AREA CLINIC 4 | Facility: CLINIC | Age: 55
End: 2025-03-28

## 2025-04-04 ENCOUNTER — TELEPHONE ENCOUNTER (OUTPATIENT)
Dept: URBAN - NONMETROPOLITAN AREA CLINIC 4 | Facility: CLINIC | Age: 55
End: 2025-04-04

## 2025-04-28 ENCOUNTER — OFFICE VISIT (OUTPATIENT)
Dept: URBAN - NONMETROPOLITAN AREA CLINIC 4 | Facility: CLINIC | Age: 55
End: 2025-04-28

## 2025-04-29 ENCOUNTER — OFFICE VISIT (OUTPATIENT)
Dept: URBAN - NONMETROPOLITAN AREA CLINIC 4 | Facility: CLINIC | Age: 55
End: 2025-04-29
Payer: COMMERCIAL

## 2025-04-29 DIAGNOSIS — K58.9 IRRITABLE BOWEL SYNDROME: ICD-10-CM

## 2025-04-29 DIAGNOSIS — R10.32 LLQ ABDOMINAL PAIN: ICD-10-CM

## 2025-04-29 DIAGNOSIS — K52.9 COLITIS: ICD-10-CM

## 2025-04-29 DIAGNOSIS — E13.9 DIABETES 1.5, MANAGED AS TYPE 2: ICD-10-CM

## 2025-04-29 PROCEDURE — 99214 OFFICE O/P EST MOD 30 MIN: CPT | Performed by: PHYSICIAN ASSISTANT

## 2025-04-29 RX ORDER — UPADACITINIB 30 MG/1
1 TABLET TABLET, EXTENDED RELEASE ORAL ONCE A DAY
Qty: 30 | Refills: 1 | Status: ACTIVE | COMMUNITY
Start: 2025-03-14 | End: 2025-05-17

## 2025-04-29 RX ORDER — ESOMEPRAZOLE MAGNESIUM 20 MG/1
1 CAPSULE CAPSULE, DELAYED RELEASE ORAL ONCE A DAY
Refills: 0 | Status: ACTIVE | COMMUNITY
Start: 1900-01-01

## 2025-04-29 RX ORDER — ENALAPRIL MALEATE AND HYDROCHLOROTHIAZIDE 10; 25 MG/1; MG/1
1 TABLET TABLET ORAL ONCE A DAY
Refills: 0 | Status: ACTIVE | COMMUNITY
Start: 1900-01-01

## 2025-04-29 RX ORDER — UPADACITINIB 30 MG/1
1 TABLET TABLET, EXTENDED RELEASE ORAL ONCE A DAY
Qty: 30 | Refills: 1
Start: 2025-04-29 | End: 2025-06-28

## 2025-04-29 RX ORDER — BUDESONIDE 3 MG/1
1 CAPSULE CAPSULE ORAL ONCE A DAY
Qty: 30 CAPSULE | Refills: 1 | Status: ON HOLD | COMMUNITY
Start: 2024-08-30

## 2025-04-29 RX ORDER — BUDESONIDE 3 MG/1
2 CAPSULES CAPSULE ORAL ONCE A DAY
Qty: 60 CAPSULE | Refills: 0 | Status: ON HOLD | COMMUNITY
Start: 2025-01-25

## 2025-04-29 RX ORDER — UPADACITINIB 45 MG/1
AS DIRECTED TABLET, EXTENDED RELEASE ORAL DAILY
Qty: 60 | Refills: 0
Start: 2025-04-29 | End: 2025-06-28

## 2025-04-29 RX ORDER — UPADACITINIB 45 MG/1
AS DIRECTED TABLET, EXTENDED RELEASE ORAL DAILY
Qty: 60 | Refills: 0 | Status: ACTIVE | COMMUNITY
Start: 2025-03-14 | End: 2025-05-17

## 2025-04-29 RX ORDER — MESALAMINE 1.2 G/1
2 TABLETS WITH A MEAL TABLET, DELAYED RELEASE ORAL ONCE A DAY
Qty: 120 TABLET | Refills: 1 | Status: ON HOLD | COMMUNITY
End: 2025-07-02

## 2025-04-29 RX ORDER — CETIRIZINE HYDROCHLORIDE 10 MG/1
1 TABLET TABLET, FILM COATED ORAL ONCE A DAY
Refills: 0 | Status: ACTIVE | COMMUNITY
Start: 1900-01-01

## 2025-04-29 RX ORDER — ONABOTULINUMTOXINA 200 [USP'U]/1
AS DIRECTED INJECTION, POWDER, LYOPHILIZED, FOR SOLUTION INTRADERMAL; INTRAMUSCULAR
Status: ACTIVE | COMMUNITY

## 2025-04-29 NOTE — HPI-TODAY'S VISIT:
50 YO FEMALE WITH HX OF GERD OBESITY MIGRAINE AND HP POLYP IN 2019 PRESENTS FOR F/U VISIT.  Pt reports in Sept she started to have change in bowel habits. She had been on abx for infection of sinus. Developed C dif infection as a result.  Pt reports that now if she goes on an abx or steroid she has a problem.  Pt reports that her stools are explosive and urgency for bowel movements as well.  Pt reports that she has been taking bentyl off and on since that starts. Takes lomotil prn as well.  Pt reports that she had IBS in the past which was improved until the C dif infection.  Pt reports that she has been taking probiotics a few times per week.  Pt reports that she has had multiple round of abx for URI . No chronic cough. No tobacco.    Pt reports that she has been on metformin for the past 2 years as well.  Pt reports that she came off the med after losing 40# and watching diet. Went back on it in Dec. Off x 4-5 ms.  Now the consistency of the stools are intermittent.  +mucous +oil in stools  5.2.24 Here for follow up after she had n/v which severe and eventually lead to near syncope.  She states this happened after visiting a ffiends family who was ill in the hospital  After the near syncope, she had BRBPR with tissue like appearance to the stools.  Her last cscope was in 2018, normal and repeat due in 2029  7.8.24 here today for repeat episode as what happened in April 2024. abd pain, rectal beeding.  I advised her to go to the ER, noted to have left sided colitis  started on ABX, HD stable discharged today from [facility]  I asked patient to come see me today  labs reviewed  non smoker, non drinker  is taking 0.25 GLP-1 Ozempic for DM II- which was started in November  CT abd pel : \*Unknown;  IMPRESSION:  1.  Mild diffuse fatty infiltration of the liver.  2.  Status post cholecystectomy and hysterectomy.  3.  Status post appendectomy.  4.  Mild thickening of the walls of the descending colon suspicious for an infectious or inflammatory colitis.  8.30.24 cscope with mild diffuse garnularity of the entire colon, with bx with no abnormality off Ozempic, but with no further bleeding but persistent intermittent abd pain.  10.2.24 doing well, although with tapering of budesonide, did have some reoccurence of cramping no fevers, no chills.  afebrile  12.3.24 no abd pain, no diarrhea taking (2) 1.2 g mesalamine tablets daily, with essentially resolution of symptoms.  3.5.25 Tracee is continuing to have abdominal pain, she denies any blood in the stool. Because of her abdominal pain I went ahead in order of repeat of the CT of the abdomen pelvis which again shows this persistent left sided colitis.  She is already taking Lialda 2 tablets daily with  no improvement in symtpoms.  Previously responded to prednisone, but no improvement with Budesonide.  4.29.25 So far it seems like we are moving along and she absolutely notices an improvement with the RInvoq induction.  no BRBPR, no diarrhea still with occasional point LLQ pain.

## 2025-06-02 ENCOUNTER — WEB ENCOUNTER (OUTPATIENT)
Dept: URBAN - NONMETROPOLITAN AREA CLINIC 4 | Facility: CLINIC | Age: 55
End: 2025-06-02

## 2025-06-02 RX ORDER — SODIUM PICOSULFATE, MAGNESIUM OXIDE, AND ANHYDROUS CITRIC ACID 12; 3.5; 1 G/175ML; G/175ML; MG/175ML
175 ML THE FIRST DOSE THE EVENING BEFORE AND SECOND DOSE THE MORNING OF COLONOSCOPY LIQUID ORAL ONCE A DAY
Qty: 350 | OUTPATIENT
Start: 2025-06-03 | End: 2025-06-05

## 2025-07-14 ENCOUNTER — TELEPHONE ENCOUNTER (OUTPATIENT)
Dept: URBAN - NONMETROPOLITAN AREA CLINIC 4 | Facility: CLINIC | Age: 55
End: 2025-07-14

## 2025-07-23 ENCOUNTER — OFFICE VISIT (OUTPATIENT)
Dept: URBAN - NONMETROPOLITAN AREA CLINIC 4 | Facility: CLINIC | Age: 55
End: 2025-07-23
Payer: COMMERCIAL

## 2025-07-23 DIAGNOSIS — E13.9 DIABETES 1.5, MANAGED AS TYPE 2: ICD-10-CM

## 2025-07-23 DIAGNOSIS — K52.89 (LYMPHOCYTIC) MICROSCOPIC COLITIS: ICD-10-CM

## 2025-07-23 DIAGNOSIS — K58.9 IRRITABLE BOWEL SYNDROME: ICD-10-CM

## 2025-07-23 DIAGNOSIS — R10.32 LLQ ABDOMINAL PAIN: ICD-10-CM

## 2025-07-23 PROCEDURE — 99214 OFFICE O/P EST MOD 30 MIN: CPT | Performed by: PHYSICIAN ASSISTANT

## 2025-07-23 RX ORDER — BUDESONIDE 3 MG/1
3 CAPSULES CAPSULE, DELAYED RELEASE PELLETS ORAL ONCE A DAY
Qty: 168 CAPSULE | Refills: 3 | OUTPATIENT
Start: 2025-07-23

## 2025-07-23 RX ORDER — CETIRIZINE HYDROCHLORIDE 10 MG/1
1 TABLET TABLET, FILM COATED ORAL ONCE A DAY
Refills: 0 | Status: ACTIVE | COMMUNITY
Start: 1900-01-01

## 2025-07-23 RX ORDER — ONABOTULINUMTOXINA 200 [USP'U]/1
AS DIRECTED INJECTION, POWDER, LYOPHILIZED, FOR SOLUTION INTRADERMAL; INTRAMUSCULAR
Status: ACTIVE | COMMUNITY

## 2025-07-23 RX ORDER — BUDESONIDE 3 MG/1
2 CAPSULES CAPSULE ORAL ONCE A DAY
Qty: 60 CAPSULE | Refills: 0 | Status: ON HOLD | COMMUNITY
Start: 2025-01-25

## 2025-07-23 RX ORDER — BUDESONIDE 3 MG/1
1 CAPSULE CAPSULE ORAL ONCE A DAY
Qty: 30 CAPSULE | Refills: 1 | Status: ON HOLD | COMMUNITY
Start: 2024-08-30

## 2025-07-23 RX ORDER — ENALAPRIL MALEATE AND HYDROCHLOROTHIAZIDE 10; 25 MG/1; MG/1
1 TABLET TABLET ORAL ONCE A DAY
Refills: 0 | Status: ACTIVE | COMMUNITY
Start: 1900-01-01

## 2025-07-23 RX ORDER — ESOMEPRAZOLE MAGNESIUM 20 MG/1
1 CAPSULE CAPSULE, DELAYED RELEASE ORAL ONCE A DAY
Refills: 0 | Status: ACTIVE | COMMUNITY
Start: 1900-01-01

## 2025-07-23 NOTE — HPI-TODAY'S VISIT:
50 YO FEMALE WITH HX OF GERD OBESITY MIGRAINE AND HP POLYP IN 2019 PRESENTS FOR F/U VISIT.  Pt reports in Sept she started to have change in bowel habits. She had been on abx for infection of sinus. Developed C dif infection as a result.  Pt reports that now if she goes on an abx or steroid she has a problem.  Pt reports that her stools are explosive and urgency for bowel movements as well.  Pt reports that she has been taking bentyl off and on since that starts. Takes lomotil prn as well.  Pt reports that she had IBS in the past which was improved until the C dif infection.  Pt reports that she has been taking probiotics a few times per week.  Pt reports that she has had multiple round of abx for URI . No chronic cough. No tobacco.    Pt reports that she has been on metformin for the past 2 years as well.  Pt reports that she came off the med after losing 40# and watching diet. Went back on it in Dec. Off x 4-5 ms.  Now the consistency of the stools are intermittent.  +mucous +oil in stools  5.2.24 Here for follow up after she had n/v which severe and eventually lead to near syncope.  She states this happened after visiting a ffiends family who was ill in the hospital  After the near syncope, she had BRBPR with tissue like appearance to the stools.  Her last cscope was in 2018, normal and repeat due in 2029  7.8.24 here today for repeat episode as what happened in April 2024. abd pain, rectal beeding.  I advised her to go to the ER, noted to have left sided colitis  started on ABX, HD stable discharged today from [facility]  I asked patient to come see me today  labs reviewed  non smoker, non drinker  is taking 0.25 GLP-1 Ozempic for DM II- which was started in November  CT abd pel : \*Unknown;  IMPRESSION:  1.  Mild diffuse fatty infiltration of the liver.  2.  Status post cholecystectomy and hysterectomy.  3.  Status post appendectomy.  4.  Mild thickening of the walls of the descending colon suspicious for an infectious or inflammatory colitis.  8.30.24 cscope with mild diffuse garnularity of the entire colon, with bx with no abnormality off Ozempic, but with no further bleeding but persistent intermittent abd pain.  10.2.24 doing well, although with tapering of budesonide, did have some reoccurence of cramping no fevers, no chills.  afebrile  12.3.24 no abd pain, no diarrhea taking (2) 1.2 g mesalamine tablets daily, with essentially resolution of symptoms.  3.5.25 Tracee is continuing to have abdominal pain, she denies any blood in the stool. Because of her abdominal pain I went ahead in order of repeat of the CT of the abdomen pelvis which again shows this persistent left sided colitis.  She is already taking Lialda 2 tablets daily with  no improvement in symtpoms.  Previously responded to prednisone, but no improvement with Budesonide.  4.29.25 So far it seems like we are moving along and she absolutely notices an improvement with the RInvoq induction.  no BRBPR, no diarrhea still with occasional point LLQ pain.  7.23.25 Seeing me after recent hospitalization with fever of 102 as well as left lower quadrant abdominal pain and diarrhea.  Stool with GDH + but cdiff toxin neg was given Vanco for treatment

## 2025-07-23 NOTE — PHYSICAL EXAM NECK/THYROID:
Monitor. normal appearance , without tenderness upon palpation , no deformities , trachea midline , Thyroid normal size , no thyroid nodules , no masses , no JVD , thyroid nontender

## 2025-08-03 ENCOUNTER — WEB ENCOUNTER (OUTPATIENT)
Dept: URBAN - NONMETROPOLITAN AREA CLINIC 4 | Facility: CLINIC | Age: 55
End: 2025-08-03

## 2025-08-11 ENCOUNTER — OFFICE VISIT (OUTPATIENT)
Dept: URBAN - METROPOLITAN AREA SURGERY CENTER 14 | Facility: SURGERY CENTER | Age: 55
End: 2025-08-11
Payer: COMMERCIAL

## 2025-08-11 DIAGNOSIS — K52.89 OTHER SPECIFIED NONINFECTIVE GASTROENTERITIS AND COLITIS: ICD-10-CM

## 2025-08-11 DIAGNOSIS — K63.89 OTHER SPECIFIED DISEASES OF INTESTINE: ICD-10-CM

## 2025-08-11 DIAGNOSIS — R93.3 ABNORMAL FINDING ON DIAGNOSTIC IMAGING OF GI TRACT: ICD-10-CM

## 2025-08-11 DIAGNOSIS — E66.01 MORBID (SEVERE) OBESITY DUE TO EXCESS CALORIES: ICD-10-CM

## 2025-08-11 DIAGNOSIS — R93.3 ABN FINDINGS-GI TRACT: ICD-10-CM

## 2025-08-11 PROCEDURE — 45380 COLONOSCOPY AND BIOPSY: CPT | Performed by: INTERNAL MEDICINE

## 2025-08-11 PROCEDURE — 00811 ANES LWR INTST NDSC NOS: CPT | Performed by: NURSE ANESTHETIST, CERTIFIED REGISTERED

## 2025-08-11 RX ORDER — ONABOTULINUMTOXINA 200 [USP'U]/1
AS DIRECTED INJECTION, POWDER, LYOPHILIZED, FOR SOLUTION INTRADERMAL; INTRAMUSCULAR
Status: ACTIVE | COMMUNITY

## 2025-08-11 RX ORDER — ESOMEPRAZOLE MAGNESIUM 20 MG/1
1 CAPSULE CAPSULE, DELAYED RELEASE ORAL ONCE A DAY
Refills: 0 | Status: ACTIVE | COMMUNITY
Start: 1900-01-01

## 2025-08-11 RX ORDER — BUDESONIDE 3 MG/1
1 CAPSULE CAPSULE ORAL ONCE A DAY
Qty: 30 CAPSULE | Refills: 1 | Status: ON HOLD | COMMUNITY
Start: 2024-08-30

## 2025-08-11 RX ORDER — ENALAPRIL MALEATE AND HYDROCHLOROTHIAZIDE 10; 25 MG/1; MG/1
1 TABLET TABLET ORAL ONCE A DAY
Refills: 0 | Status: ACTIVE | COMMUNITY
Start: 1900-01-01

## 2025-08-11 RX ORDER — CETIRIZINE HYDROCHLORIDE 10 MG/1
1 TABLET TABLET, FILM COATED ORAL ONCE A DAY
Refills: 0 | Status: ACTIVE | COMMUNITY
Start: 1900-01-01

## 2025-08-11 RX ORDER — BUDESONIDE 3 MG/1
3 CAPSULES CAPSULE, DELAYED RELEASE PELLETS ORAL ONCE A DAY
Qty: 168 CAPSULE | Refills: 3 | Status: ACTIVE | COMMUNITY
Start: 2025-07-23

## 2025-08-11 RX ORDER — BUDESONIDE 3 MG/1
2 CAPSULES CAPSULE ORAL ONCE A DAY
Qty: 60 CAPSULE | Refills: 0 | Status: ON HOLD | COMMUNITY
Start: 2025-01-25

## 2025-08-26 ENCOUNTER — OFFICE VISIT (OUTPATIENT)
Dept: URBAN - NONMETROPOLITAN AREA CLINIC 4 | Facility: CLINIC | Age: 55
End: 2025-08-26
Payer: COMMERCIAL

## 2025-08-26 DIAGNOSIS — K52.9 COLITIS: ICD-10-CM

## 2025-08-26 DIAGNOSIS — K58.9 IRRITABLE BOWEL SYNDROME: ICD-10-CM

## 2025-08-26 DIAGNOSIS — R10.32 LLQ ABDOMINAL PAIN: ICD-10-CM

## 2025-08-26 DIAGNOSIS — E13.9 DIABETES 1.5, MANAGED AS TYPE 2: ICD-10-CM

## 2025-08-26 PROCEDURE — 99214 OFFICE O/P EST MOD 30 MIN: CPT | Performed by: PHYSICIAN ASSISTANT

## 2025-08-26 RX ORDER — BUDESONIDE 3 MG/1
3 CAPSULES CAPSULE, DELAYED RELEASE PELLETS ORAL ONCE A DAY
Qty: 168 CAPSULE | Refills: 3 | OUTPATIENT
Start: 2025-08-26

## 2025-08-26 RX ORDER — BUDESONIDE 3 MG/1
1 CAPSULE CAPSULE ORAL ONCE A DAY
Qty: 30 CAPSULE | Refills: 1 | Status: ON HOLD | COMMUNITY
Start: 2024-08-30

## 2025-08-26 RX ORDER — ONABOTULINUMTOXINA 200 [USP'U]/1
AS DIRECTED INJECTION, POWDER, LYOPHILIZED, FOR SOLUTION INTRADERMAL; INTRAMUSCULAR
Status: ACTIVE | COMMUNITY

## 2025-08-26 RX ORDER — DICYCLOMINE HYDROCHLORIDE 10 MG/1
2 CAPSULES CAPSULE ORAL THREE TIMES A DAY
Qty: 180 | OUTPATIENT
Start: 2025-08-26

## 2025-08-26 RX ORDER — BUDESONIDE 3 MG/1
3 CAPSULES CAPSULE, DELAYED RELEASE PELLETS ORAL ONCE A DAY
Qty: 168 CAPSULE | Refills: 3 | Status: ACTIVE | COMMUNITY
Start: 2025-07-23

## 2025-08-26 RX ORDER — CETIRIZINE HYDROCHLORIDE 10 MG/1
1 TABLET TABLET, FILM COATED ORAL ONCE A DAY
Refills: 0 | Status: ACTIVE | COMMUNITY
Start: 1900-01-01

## 2025-08-26 RX ORDER — ESOMEPRAZOLE MAGNESIUM 20 MG/1
1 CAPSULE CAPSULE, DELAYED RELEASE ORAL ONCE A DAY
Refills: 0 | Status: ACTIVE | COMMUNITY
Start: 1900-01-01

## 2025-08-26 RX ORDER — UPADACITINIB 15 MG/1
1 TABLET TABLET, EXTENDED RELEASE ORAL ONCE A DAY
Qty: 30 | OUTPATIENT
Start: 2025-08-26 | End: 2025-09-25

## 2025-08-26 RX ORDER — BUDESONIDE 3 MG/1
2 CAPSULES CAPSULE ORAL ONCE A DAY
Qty: 60 CAPSULE | Refills: 0 | Status: ON HOLD | COMMUNITY
Start: 2025-01-25

## 2025-08-26 RX ORDER — ENALAPRIL MALEATE AND HYDROCHLOROTHIAZIDE 10; 25 MG/1; MG/1
1 TABLET TABLET ORAL ONCE A DAY
Refills: 0 | Status: ACTIVE | COMMUNITY
Start: 1900-01-01